# Patient Record
Sex: MALE | Race: WHITE | NOT HISPANIC OR LATINO | Employment: STUDENT | URBAN - METROPOLITAN AREA
[De-identification: names, ages, dates, MRNs, and addresses within clinical notes are randomized per-mention and may not be internally consistent; named-entity substitution may affect disease eponyms.]

---
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
## 2025-01-08 ENCOUNTER — HOSPITAL ENCOUNTER (EMERGENCY)
Facility: HOSPITAL | Age: 20
Discharge: HOME | End: 2025-01-08
Payer: COMMERCIAL

## 2025-01-08 VITALS
HEART RATE: 98 BPM | DIASTOLIC BLOOD PRESSURE: 85 MMHG | RESPIRATION RATE: 14 BRPM | OXYGEN SATURATION: 100 % | TEMPERATURE: 99.3 F | SYSTOLIC BLOOD PRESSURE: 144 MMHG

## 2025-01-08 DIAGNOSIS — T78.40XA ALLERGIC REACTION, INITIAL ENCOUNTER: Primary | ICD-10-CM

## 2025-01-08 PROCEDURE — 2500000001 HC RX 250 WO HCPCS SELF ADMINISTERED DRUGS (ALT 637 FOR MEDICARE OP): Performed by: EMERGENCY MEDICINE

## 2025-01-08 PROCEDURE — 99283 EMERGENCY DEPT VISIT LOW MDM: CPT

## 2025-01-08 PROCEDURE — 2500000004 HC RX 250 GENERAL PHARMACY W/ HCPCS (ALT 636 FOR OP/ED): Performed by: EMERGENCY MEDICINE

## 2025-01-08 RX ORDER — PREDNISONE 20 MG/1
20 TABLET ORAL ONCE
Status: COMPLETED | OUTPATIENT
Start: 2025-01-08 | End: 2025-01-08

## 2025-01-08 RX ORDER — FAMOTIDINE 20 MG/1
20 TABLET, FILM COATED ORAL 2 TIMES DAILY
Qty: 14 TABLET | Refills: 0 | Status: SHIPPED | OUTPATIENT
Start: 2025-01-08 | End: 2025-01-15

## 2025-01-08 RX ORDER — DIPHENHYDRAMINE HCL 25 MG
25 CAPSULE ORAL ONCE
Status: COMPLETED | OUTPATIENT
Start: 2025-01-08 | End: 2025-01-08

## 2025-01-08 RX ORDER — FAMOTIDINE 20 MG/1
20 TABLET, FILM COATED ORAL ONCE
Status: COMPLETED | OUTPATIENT
Start: 2025-01-08 | End: 2025-01-08

## 2025-01-08 RX ADMIN — PREDNISONE 20 MG: 20 TABLET ORAL at 19:23

## 2025-01-08 RX ADMIN — DIPHENHYDRAMINE HYDROCHLORIDE 25 MG: 25 CAPSULE ORAL at 19:23

## 2025-01-08 RX ADMIN — FAMOTIDINE 20 MG: 20 TABLET, FILM COATED ORAL at 19:23

## 2025-01-08 ASSESSMENT — COLUMBIA-SUICIDE SEVERITY RATING SCALE - C-SSRS
1. IN THE PAST MONTH, HAVE YOU WISHED YOU WERE DEAD OR WISHED YOU COULD GO TO SLEEP AND NOT WAKE UP?: NO
2. HAVE YOU ACTUALLY HAD ANY THOUGHTS OF KILLING YOURSELF?: NO
6. HAVE YOU EVER DONE ANYTHING, STARTED TO DO ANYTHING, OR PREPARED TO DO ANYTHING TO END YOUR LIFE?: NO

## 2025-01-08 NOTE — ED TRIAGE NOTES
TRIAGE NOTE   I saw the patient as the Clinician in Triage and performed a brief history and physical exam, established acuity, and ordered appropriate tests to develop basic plan of care. Patient will be seen by an [facility], resident and/or physician who will independently evaluate the patient. Please see subsequent provider notes for further details and disposition.     Brief HPI: In brief, Phoenix Morse is a 19 y.o. male that presents for allergic reaction. Hx of coconut and tree nut allergy.  Ate teriyaki sauce unclear ingredients. Throat closing sensation, no difficulty breathing, no hives, no vomiting, no chest pain.      Focused Physical exam:   Heart RRR  Lungs CTAB  Throat airway patent  Skin no hives     Plan/MDM:   Prednisone benadryl pepcid epi as needed     Please see subsequent provider note for further details and disposition

## 2025-01-09 NOTE — DISCHARGE INSTRUCTIONS
Follow up with PCP within two days for further evaluation and management of care    Prescription for Famotide sent to pharmacy.  You reported having Epi pen at home and Benadryl. Take medications as prescribed.    Follow up instructions discussed. ED precautions discussed and advised to return to ED if symptoms worsen or persist for follow up.

## 2025-01-12 NOTE — ED PROVIDER NOTES
HPI     CC: Allergic Reaction     HPI: Phoenix Morse is a 19 y.o. male with past medical history of Asthma presents with dad for complaints of allergic reaction. He endorses associated sensation of feeling like his throat is closing, and feeling like a lump in his throat. 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  He reports eating teriyaki sauce, although unclear of ingredients symptoms started after ingestion of Teriyaki. Denies difficulty breathing, no hives, no vomiting, no chest pain.       ROS: 10-point review of systems was performed and is otherwise negative except as noted in HPI.      Past Medical History: Noncontributory except per HPI     Past Surgical History: Noncontributory except per HPI     Family History: Reviewed and noncontributory     Social History:  Noncontributory except per HPI       Allergies   Allergen Reactions    Tree Nuts Anaphylaxis       Past Medical History:   Diagnosis Date    Asthma ([facility])     Food allergy     treenut, anaphlyaxis       Home Meds:   Current Outpatient Medications   Medication Instructions    Allegra-D 24 Hour 180-240 mg 24 hr tablet 1 tablet, oral, Daily    azelastine (Astelin) 137 mcg (0.1 %) nasal spray 2 sprays, Each Nostril, 2 times daily    famotidine (PEPCID) 20 mg, oral, 2 times daily    inulin (FIBER GUMMIES ORAL) 2 Pieces, oral, Daily    linaCLOtide (LINZESS) 145 mcg, oral, Daily before breakfast, Do not crush or chew.    sodium chloride-Aloe vera gel ([place]yr Saline) gel topical gel 1 Application, nasal, 2 times daily PRN        ED Triage Vitals [01/08/25 1804]   Temperature Heart Rate Respirations BP   37.4 °C (99.3 °F) 98 14 144/85      Pulse Ox Temp Source Heart Rate Source Patient Position   100 % Tympanic -- Sitting      BP Location FiO2 (%)     Right arm --               Physical Exam:  Physical Exam  Vitals and nursing note reviewed.   Constitutional:       General: He is not in acute distress.     Appearance: He is well-developed.   HENT:       Head: Normocephalic and atraumatic.   Eyes:      Conjunctiva/sclera: Conjunctivae normal.   Cardiovascular:      Rate and Rhythm: Normal rate and regular rhythm.      Heart sounds: No murmur heard.  Pulmonary:      Effort: Pulmonary effort is normal. No respiratory distress.      Breath sounds: Normal breath sounds.   Abdominal:      Palpations: Abdomen is soft.      Tenderness: There is no abdominal tenderness.   Musculoskeletal:         General: No swelling.      Cervical back: Neck supple.   Skin:     General: Skin is warm and dry.      Capillary Refill: Capillary refill takes less than 2 seconds.   Neurological:      Mental Status: He is alert.   Psychiatric:         Mood and Affect: Mood normal.          Diagnostic Results        Labs Reviewed - No data to display      No orders to display                 No data recorded                Procedure  Procedures    ED Course & MDM   Assessment/Plan:     Medications   diphenhydrAMINE (BENADryl) capsule 25 mg (25 mg oral Given 1/8/25 1923)   famotidine (Pepcid) tablet 20 mg (20 mg oral Given 1/8/25 1923)   predniSONE (Deltasone) tablet 20 mg (20 mg oral Given 1/8/25 1923)        Diagnoses as of 01/11/25 2242   Allergic reaction, initial encounter       Medical Decision Making    Phoenix Morse is a 19 y.o. male presents with dad who is historian for complaints of allergic reaction, sensation of feeling like his throat is closing, and feeling like a lump in his throat. He apparently has a coconut and tree nut allergy, unclear of ingredients of Teriyaki, after ingestion of Teriyaki symptoms started. Denies difficulty breathing, no hives, no vomiting, no chest pain.       He is given Benadryl, Prednisone and Pepcid with relief. He has an epi pen at home but did not use did not feel symptoms were that severe.     On exam he is alert and oriented X3, NAD noted. Afebrile, VSS. Pharynx/Tonsils without erythema, swelling or purulent drainage. Uvula midline.  Peritonsillar abscess noted. Airway patent. No lymphadenopathy. Lungs clear throughout. No cough noted on exam. Speaking in full sentences without conversational dyspnea. Heart RRR. Abdomen soft non tender, non distended, BSX4. Skin without hives.    I discussed findings with patient and they are safe for discharge and outpatient treatment. He is advised to follow up with PCP within two days for further evaluation and management of care    Prescription for Famotide sent to pharmacy.  You reported having Epi pen at home and Benadryl. Take medications as prescribed.    Follow up instructions discussed. ED precautions discussed and advised to return to ED if symptoms worsen or persist for follow up.    Counseling: Spoke with the patient and discussed today´s findings, in addition to providing specific details for the plan of care and expected course. Patient was given the opportunity to ask questions     He expressed understanding was agreeable with plan and discharge at this time. Discharged in hemodynamically stable condition.          Disposition: Home    ED Prescriptions       Medication Sig Dispense Start Date End Date Auth. Provider    famotidine (Pepcid) 20 mg tablet Take 1 tablet (20 mg) by mouth 2 times a day for 7 days. 14 tablet 1/8/2025 1/15/2025 ADAN Lal            Social Determinants Affecting Care: none     ADAN Lal    This note was dictated by speech recognition. Minor errors in transcription may be present.     ADAN Lal  01/11/25 7940